# Patient Record
Sex: MALE | Race: WHITE
[De-identification: names, ages, dates, MRNs, and addresses within clinical notes are randomized per-mention and may not be internally consistent; named-entity substitution may affect disease eponyms.]

---

## 2019-03-08 ENCOUNTER — HOSPITAL ENCOUNTER (OUTPATIENT)
Dept: HOSPITAL 80 - FCPNEURO | Age: 19
End: 2019-03-08
Attending: PSYCHIATRY & NEUROLOGY
Payer: MEDICAID

## 2019-03-08 DIAGNOSIS — R56.9: ICD-10-CM

## 2019-03-08 DIAGNOSIS — R94.01: Primary | ICD-10-CM

## 2019-03-11 NOTE — CPEEG
[f 
rep st]



                                                      ELECTROENCEPHALOGRAM





4-HOUR VIDEO EEG.



DATE OF STUDY:  03/08/2019



DATE OF INTERPRETATION:  03/11/2019



INTERPRETATION:  This 4-hour video EEG recording is abnormal due to the 
following reasons:



1.  There were sharply contoured waveforms of uncertain clinical significance 
over the left posterior temporal head region and probable rare, potentially 
epileptogenic abnormalities over the left posterior temporal head regions.  
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

2.  There was a moderately severe degree of hemispheric slowing and asymmetry 
over the left, maximal left posterior temporal.  These findings are consistent 
with the patient's known previous history of neurotrauma in these regions.



The patient did not have any clinical events or electrographic seizures during 
the video EEG monitoring session.



REPORT:  This 4-hour video EEG contains 10 Hz alpha activity over the right 
posterior head region.  There is a moderately severe degree of hemispheric 
slowing over the left composed of intermittent theta and delta activity, 
maximal over the left posterior temporal head region.  In addition, there was 
asymmetry of the left hemisphere with loss of normal background activity and 
slowing of frequencies. 



There were frequent sharply contoured waveforms of uncertain clinical 
significance over the left temporal head region, maximal left posterior 
temporal.  In addition, there were rare, more clear-cut spikes and sharp waves 
over the left posterior temporal head region.  For an example, see Epoch 1110.  
There were no electrographic seizures or clinical events recorded during the 
video EEG monitoring session.





Job #:  [id number]

MTDD